# Patient Record
Sex: MALE | Race: WHITE | HISPANIC OR LATINO | Employment: FULL TIME | URBAN - METROPOLITAN AREA
[De-identification: names, ages, dates, MRNs, and addresses within clinical notes are randomized per-mention and may not be internally consistent; named-entity substitution may affect disease eponyms.]

---

## 2021-08-09 ENCOUNTER — OFFICE VISIT (OUTPATIENT)
Dept: BEHAVIORAL HEALTH | Facility: CLINIC | Age: 26
End: 2021-08-09
Payer: COMMERCIAL

## 2021-08-09 VITALS — HEIGHT: 69 IN | WEIGHT: 171.2 LBS | BODY MASS INDEX: 25.36 KG/M2

## 2021-08-09 DIAGNOSIS — F90.2 ATTENTION DEFICIT HYPERACTIVITY DISORDER (ADHD), COMBINED TYPE: ICD-10-CM

## 2021-08-09 DIAGNOSIS — F41.1 GENERALIZED ANXIETY DISORDER: ICD-10-CM

## 2021-08-09 PROCEDURE — 99204 OFFICE O/P NEW MOD 45 MIN: CPT | Mod: GC | Performed by: PSYCHIATRY & NEUROLOGY

## 2021-08-09 PROCEDURE — 96127 BRIEF EMOTIONAL/BEHAV ASSMT: CPT | Performed by: PSYCHIATRY & NEUROLOGY

## 2021-08-09 RX ORDER — DEXTROAMPHETAMINE SACCHARATE, AMPHETAMINE ASPARTATE, DEXTROAMPHETAMINE SULFATE AND AMPHETAMINE SULFATE 3.75; 3.75; 3.75; 3.75 MG/1; MG/1; MG/1; MG/1
15 TABLET ORAL 2 TIMES DAILY
Qty: 60 TABLET | Refills: 0 | Status: SHIPPED | OUTPATIENT
Start: 2021-10-01 | End: 2021-10-31

## 2021-08-09 RX ORDER — DEXTROAMPHETAMINE SACCHARATE, AMPHETAMINE ASPARTATE, DEXTROAMPHETAMINE SULFATE AND AMPHETAMINE SULFATE 3.75; 3.75; 3.75; 3.75 MG/1; MG/1; MG/1; MG/1
15 TABLET ORAL 2 TIMES DAILY
Qty: 60 TABLET | Refills: 0 | Status: SHIPPED | OUTPATIENT
Start: 2021-09-01 | End: 2021-10-01

## 2021-08-09 RX ORDER — FLUOXETINE HYDROCHLORIDE 40 MG/1
40 CAPSULE ORAL DAILY
Qty: 90 CAPSULE | Refills: 0 | Status: SHIPPED
Start: 2021-08-09 | End: 2022-03-04

## 2021-08-09 RX ORDER — SIMVASTATIN 10 MG
10 TABLET ORAL NIGHTLY
COMMUNITY

## 2021-08-09 RX ORDER — DEXTROAMPHETAMINE SACCHARATE, AMPHETAMINE ASPARTATE, DEXTROAMPHETAMINE SULFATE AND AMPHETAMINE SULFATE 3.75; 3.75; 3.75; 3.75 MG/1; MG/1; MG/1; MG/1
15 TABLET ORAL 2 TIMES DAILY
Qty: 60 TABLET | Refills: 0 | Status: SHIPPED | OUTPATIENT
Start: 2021-10-31 | End: 2022-01-05 | Stop reason: SDUPTHER

## 2021-08-09 ASSESSMENT — ANXIETY QUESTIONNAIRES
2. NOT BEING ABLE TO STOP OR CONTROL WORRYING: NOT AT ALL
1. FEELING NERVOUS, ANXIOUS, OR ON EDGE: NOT AT ALL
3. WORRYING TOO MUCH ABOUT DIFFERENT THINGS: NOT AT ALL
IF YOU CHECKED OFF ANY PROBLEMS ON THIS QUESTIONNAIRE, HOW DIFFICULT HAVE THESE PROBLEMS MADE IT FOR YOU TO DO YOUR WORK, TAKE CARE OF THINGS AT HOME, OR GET ALONG WITH OTHER PEOPLE: NOT DIFFICULT AT ALL
4. TROUBLE RELAXING: NOT AT ALL
7. FEELING AFRAID AS IF SOMETHING AWFUL MIGHT HAPPEN: NOT AT ALL
5. BEING SO RESTLESS THAT IT IS HARD TO SIT STILL: NOT AT ALL
6. BECOMING EASILY ANNOYED OR IRRITABLE: NOT AT ALL
GAD7 TOTAL SCORE: 0

## 2021-08-09 ASSESSMENT — PATIENT HEALTH QUESTIONNAIRE - PHQ9
CLINICAL INTERPRETATION OF PHQ2 SCORE: 0
5. POOR APPETITE OR OVEREATING: 0 - NOT AT ALL

## 2021-08-09 NOTE — PROGRESS NOTES
"  INITIAL PSYCHIATRIC EVALUATION      This provider informed the patient their medical records are totally confidential except for the use by other providers involved in their care, or if the patient signs a release, or to report instances of child or elder abuse, or if it is determined they are an immediate risk to harm themselves or others.      CHIEF COMPLAINT  \"I was referred here to be able to keep taking my medications\"       HISTORY OF PRESENT ILLNESS  Tahir Knight is a 25 y.o. old male comes in today to establish care and for evaluation of ADHD and anxiety. Patient was previously being seen at [facility] psychiatry.    Patient states he was first diagnosed with ADHD in devin high and took Adderall through middle school and high school. He discontinued this after graduating for some time and started experiencing higher levels of anxiety in 2018. Patient started seeing therapist with benefit for anxiety and was started on Prozac 10mg by psychiatry. This was tapered up to 40mg with benefit. Patient notes that after anxiety improved, he started noticing greater impact of not being able to focus and complete tasks. His job changed to requiring more frequent focus and inability to walk around and do other tasks simultaneously. He also states he started school at this time. He was tapered up on Adderall to 15mg PO BID. He has been on this combination of medications for approximately 6 months with benefit. He feels that he is able to focus and complete tasks and does well at work. He has noticed an improvement in his job performance and will be starting his Bachelor's degree in the fall after recently earning his Associates. He denies excessive worry, feeling on edge, difficulty relaxing. He states he has not had period with racing thoughts, difficulty breathing, shakiness, palpitations in several years. Patient notes he has tried XR formulation of Adderall before and disliked this as his job does not consistently require " increased focus in the afternoon and he felt this interfered with his ability to do other activities he enjoyed.     Adult ADHD Self-Report Scale (ASRS-v1.1) Symptom - answered based on when patient is not taking medication     1. How often do you have trouble wrapping up the final details of a project, once the challenging parts have been done?   Often   2. How often do you have difficulty getting things in order when you have to do a task that requires organization?   Sometime   3. How often do you have problems remembering appointments or obligations?   Very often   4. When you have a task that requires a lot of thought, how often do you avoid or delay getting started?   Sometime   5. How often do you fidget or squirm with your hands or feet when you have to sit down for a long time?   Very often   6. How often do you feel overly active and compelled to do things, like you were driven by a motor?   Very often   7. How often do you make careless mistakes when you have to work on a boring or difficult project?   Never   8. How often do you have difficulty keeping your attention when you are doing boring or repetitive work?   Very often   9. How often do you have difficulty concentrating on what people say to you, even when they are speaking to you directly?   Rarely   10. How often do you misplace or have difficulty finding things at home or at work?   Very often   11. How often are you distracted by activity or noise around you?   Rarely   12. How often do you leave your seat in meetings or other situations in which you are expected to remain seated?   Very often   13. How often do you feel restless or fidgety?   Sometime   14. How often do you have difficulty unwinding and relaxing when you have time to yourself?   Rarely   15. How often do you find yourself talking too much when you are in social situations?   Very often   16. When you're in a conversation, how often do you find yourself finishing the sentences  of the people you are talking to, before they can finish them themselves?   Sometime   17. How often do you have difficulty waiting your turn in situations when turn taking is required?   Never   18. How often do you interrupt others when they are busy?   Sometime       PSYCHIATRIC REVIEW OF SYSTEMS: denies depressive symptoms, denies symptoms of anxiety that interfere with functioning or are overwhelming, denies manic symptoms, denies psychotic symptoms including AH / VH and denies trauma related symptoms      MEDICAL REVIEW OF SYSTEMS:   Constitutional negative   Eyes negative   Ears/Nose/Mouth/Throat negative   Cardiovascular negative   Respiratory negative   Gastrointestinal negative   Genitourinary negative   Muscular negative   Integumentary negative   Neurological negative   Endocrine negative   Hematologic/Lymphatic negative     CURRENT MEDICATIONS:  Truvada PO daily   Simvastatin 10mg PO daily  Prozac 40mg PO daily  Adderall 15mg PO BID    ALLERGIES:  NKDA    PAST PSYCHIATRIC HISTORY  Prior psychiatric hospitalization: denies  Prior Self harm/suicide attempt: denies  Prior Diagnosis: ADHD, AUDI    PAST PSYCHIATRIC MEDICATIONS  • Adderall and Prozac      FAMILY HISTORY  Psychiatric diagnosis:  Maternal history of depression, siblings depression   History of suicide attempts:  denies  Substance abuse history:  denies    SUBSTANCE USE HISTORY:  ALCOHOL: once every few months   TOBACCO: denies  CANNABIS: denies  OPIOIDS: denies  PRESCRIPTION MEDICATIONS: denies  OTHERS: denies  History of inpatient/outpatient rehab treatment: n/a    SOCIAL HISTORY  Childhood: Patient grew up in [place], [place] and moved in 2019. He states positive relationship with his mother and childhood. He has an older sister and older brother as well as a younger brother. He is closest to his older brother.   Education: currently going to school for Bachelor's degree  Employment:  for [employer]   Relationship: denies  Kids:  "denies  Current living situation: lives with roommate, states positive relationship and living situation   History of emotional/physical/sexual abuse - denies      MEDICAL HISTORY  Hyperlipidemia diagnosed March 2020     PHYSICAL EXAMINAION:  Vital signs:   Vitals:    08/09/21 1501   Weight: 77.7 kg (171 lb 3.2 oz)   Height: 1.753 m (5' 9\")     Musculoskeletal: Normal gait.   Abnormal movements: no abnormal movements noted     MENTAL STATUS EXAMINATION      General:   - Grooming and hygiene: Casual and Neat,   - Apparent distress: no apparent distress ,   - Behavior: Calm  - Eye Contact:  Good,   - no psychomotor agitation or retardation =   - Participation: Active verbal participation  Orientation: Alert and Fully Oriented to person, place and time  Mood: Euthymic  Affect: Flexible and Full range,  Thought Process: Logical and Goal-directed  Thought Content: Denies suicidal or homicidal ideations, intent or plan Within normal limits  Perception: Denies auditory or visual hallucinations. No delusions noted Within normal limits  Attention span and concentration: Intact   Speech:Rate within normal limits and Volume within normal limits  Language: Appropriate   Insight: Good  Judgment: Good  Recent and remote memory: No gross evidence of memory deficits      DEPRESSION SCREENING:  No flowsheet data found.    Interpretation of PHQ-9 Total Score   Score Severity   1-4 No Depression   5-9 Mild Depression   10-14 Moderate Depression   15-19 Moderately Severe Depression   20-27 Severe Depression      SAFETY ASSESSMENT - SELF:    Does patient acknowledge current or past symptoms of dangerousness to self? no  History of suicide by family member: no  History of suicide by friend/significant other: no  Recent change in amount/specificity/intensity of suicidal thoughts or self-harm behavior? no  Current access to firearms, medications, or other identified means of suicide/self-harm? guns locked in home  If yes, willing to " restrict access to means of suicide/self-harm?   Protective factors present: good family support, close friend group        SAFETY ASSESSMENT - OTHERS:    Does patient acknowledge current or past symptoms of aggressive behavior or risk to others? no  Recent change in amount/specificity/intensity of thoughts or threats to harm others? no  Current access to firearms/other identified means of harm? guns locked in home   If yes, willing to restrict access to weapons/means of harm?        CURRENT RISK:       Suicidal: Low       Homicidal: Low       Self-Harm: Low       Relapse: Not applicable       Crisis Safety Plan Reviewed Not Indicated    MEDICAL RECORDS/LABS/DIAGNOSTIC TESTS REVIEWED:  reviewed      [place] [facility] records -   Reviewed, appropriate       ASSESSMENT  Tahir Knight is a 25 y.o. old male presenting to establish care for ADHD and anxiety. Patient has been taking Prozac 40mg daily and Adderall 15mg PO BID with benefit. He states resolution of symptoms and stability over the last 6 months. He currently denies increase in excessive worry, feeling on edge, or depressed mood. He denies suicidal ideation, plan or intent. He feels that he has been engaging well in work and school with current dose of medication. Reviewed safety of medication and controlled substance agreement with patient.       DIFFERENTIAL DIAGNOSES  1. Attention Deficit Hyperactivity Disorder, Combined Type  2. Generalized Anxiety Disorder       PLAN:  • Continue Prozac 40mg PO daily  • Continue Adderall 15mg PO BID   • Controlled Substance Agreement signed    • I reviewed clinical lab tests done in last 1 year. Patient will have labs done by PCP, requested results   • Medication options, alternatives (including no medications) and medication risks/benefits/side effects were discussed in detail.  • The patient was advised to call, message provider on MyChart, or come in to the clinic if symptoms worsen or if any future questions/issues regarding their  medications arise; the patient verbalized understanding and agreement.    • The patient was educated to call 911, call the suicide hotline, or go to local ER if having thoughts of suicide or homicide; verbalized understanding.        Return to clinic in 3 months or sooner if symptoms worsen.  Next Appointment:  instruction provided on how to make the next appointment.     The proposed treatment plan was discussed with the patient who was provided the opportunity to ask questions and make suggestions regarding alternative treatment. Patient verbalized understanding and expressed agreement with the plan.     Thank you for allowing me to participate in the care of this patient.    Aida Adkins D.O.  08/09/21    CC:   Kacy Brink M.D.

## 2021-08-30 ENCOUNTER — DOCUMENTATION (OUTPATIENT)
Dept: BEHAVIORAL HEALTH | Facility: CLINIC | Age: 26
End: 2021-08-30

## 2021-12-03 ENCOUNTER — OFFICE VISIT (OUTPATIENT)
Dept: BEHAVIORAL HEALTH | Facility: CLINIC | Age: 26
End: 2021-12-03
Payer: COMMERCIAL

## 2021-12-03 DIAGNOSIS — F90.2 ATTENTION DEFICIT HYPERACTIVITY DISORDER (ADHD), COMBINED TYPE: ICD-10-CM

## 2021-12-03 DIAGNOSIS — F41.1 GENERALIZED ANXIETY DISORDER: ICD-10-CM

## 2021-12-03 PROCEDURE — 99214 OFFICE O/P EST MOD 30 MIN: CPT | Mod: GC | Performed by: PSYCHIATRY & NEUROLOGY

## 2021-12-04 NOTE — PROGRESS NOTES
"[facility]  PSYCHIATRIC FOLLOW-UP NOTE    Persons in attendance: Patient      Reason for visit / chief complaint:   25 year old male wit history of ADHD and generalized anxiety disorder presenting for medication management. Patient was continued on Prozac 40mg and Adderall 15mg PO BID.    \"Things have been good\"      SUBJECTIVE / HPI:  Patient states he went to [place] last week on vacation and feels that this was positive. He feels he has had continued stability in mood and anxiety. He states this is a busy time at work and that makes this time more difficult. He has been engaging in activities he enjoys and states a positive work life balance. He denies changes in sleep, appetite or energy. He denies suicidal ideation, plan or intent.   He states he has had continued benefit with concentration and task completion. He usees second dose on days where he feels he has a mascorro work schedule in the afternoon. He states he enjoys being able to have control of dosing in this way and flexibility.         OBJECTIVE:    MSE :   Appearance: well groomed, appropriate    Behavior: calm, cooperative, pleasant, engaged. good eye contact.  No tics. No mannerisms.   Speech : normal rate, normal volume, normal tone   Language: normal vocabulary   Mood: \"good\"   Affect: euthymic   Thought Process: linear, coherent, goal-directed. No flight of ideas.  No loose associations   Thought Content: no suicidal or homicidal ideation and no auditory or visual hallucinations    Attention/Concentration: appropriate    Memory: no gross deficits   Orientation: oriented to person, place, situation   Neurological: Deferred   Fund of Knowledge: appropriate   Insight/Judgment: good / good            No Known Allergies     PAST PSYCHIATRIC HISTORY  Prior psychiatric hospitalization: denies  Prior Self harm/suicide attempt: denies  Prior Diagnosis: ADHD, AUDI     PAST PSYCHIATRIC MEDICATIONS  · Adderall and Prozac       FAMILY " HISTORY  Psychiatric diagnosis:  Maternal history of depression, siblings depression   History of suicide attempts:  denies  Substance abuse history:  denies     SUBSTANCE USE HISTORY:  ALCOHOL: once every few months   TOBACCO: denies  CANNABIS: denies  OPIOIDS: denies  PRESCRIPTION MEDICATIONS: denies  OTHERS: denies  History of inpatient/outpatient rehab treatment: n/a     SOCIAL HISTORY  Childhood: Patient grew up in [place], [place] and moved in 2019. He states positive relationship with his mother and childhood. He has an older sister and older brother as well as a younger brother. He is closest to his older brother.   Education: currently going to school for Bachelor's degree  Employment:  for [employer]   Relationship: denies  Kids: denies  Current living situation: lives with roommate, states positive relationship and living situation   History of emotional/physical/sexual abuse - denies           Review of systems:        Constitutional negative   Eyes negative   Ears/Nose/Mouth/Throat negative   Cardiovascular negative   Respiratory negative   Gastrointestinal negative   Genitourinary negative   Muscular negative   Integumentary negative   Neurological negative   Endocrine negative   Hematologic/Lymphatic negative       Medical Records/Labs/Diagnostic Tests Reviewed:   No new results present           Current Outpatient Medications:   •  fluoxetine, 40 mg, Oral, DAILY  •  simvastatin, 10 mg, Oral, Nightly  •  Emtricitabine-Tenofovir DF (TRUVADA PO), Take  by mouth.           ASSESSMENT:  26 year old male presenting for medication management. Patient states he has done well with medication regimen. Discussed with patient long term plans with medication and trial of Prozac reduction in the future due to prolonged period of stability and benefit. Patient states hesitation with change at this time due to increased demands that will be present at work throughout the winter.       DDX:  1.  Generalized anxiety disorder     2. Attention deficit hyperactivity disorder (ADHD), combined type             PLAN:  - Continue Adderall 15mg PO BID   -Continue Prozac 40mg PO daily   -Medication options, alternatives (including no medications) and medication risks/benefits/side effects were discussed in detail.  -The patient was advised to call, message provider on [employer], or come in to the clinic if symptoms worsen or if any future questions/issues regarding their medications arise; the patient verbalized understanding and agreement.    -The patient was educated to call 911, call the suicide hotline, or go to local ER if having thoughts of suicide or homicide; verbalized understanding.            - Medical Records/Labs/Diagnostic Tests Ordered: Nav Adkins DO

## 2022-01-05 DIAGNOSIS — F90.2 ATTENTION DEFICIT HYPERACTIVITY DISORDER (ADHD), COMBINED TYPE: ICD-10-CM

## 2022-01-05 RX ORDER — DEXTROAMPHETAMINE SACCHARATE, AMPHETAMINE ASPARTATE, DEXTROAMPHETAMINE SULFATE AND AMPHETAMINE SULFATE 3.75; 3.75; 3.75; 3.75 MG/1; MG/1; MG/1; MG/1
15 TABLET ORAL 2 TIMES DAILY
Qty: 60 TABLET | Refills: 0 | Status: SHIPPED | OUTPATIENT
Start: 2022-01-05 | End: 2022-02-07

## 2022-01-05 NOTE — TELEPHONE ENCOUNTER
Pts pharmacy never received prescription that was supposed to be sent on the day of pts appt. 01/03        Received request via: Patient    Was the patient seen in the last year in this department? Yes    Does the patient have an active prescription (recently filled or refills available) for medication(s) requested? No

## 2022-02-04 DIAGNOSIS — F90.2 ATTENTION DEFICIT HYPERACTIVITY DISORDER (ADHD), COMBINED TYPE: ICD-10-CM

## 2022-02-07 RX ORDER — DEXTROAMPHETAMINE SACCHARATE, AMPHETAMINE ASPARTATE, DEXTROAMPHETAMINE SULFATE AND AMPHETAMINE SULFATE 3.75; 3.75; 3.75; 3.75 MG/1; MG/1; MG/1; MG/1
TABLET ORAL
Qty: 60 TABLET | Refills: 0 | Status: SHIPPED | OUTPATIENT
Start: 2022-02-07 | End: 2022-03-04 | Stop reason: SDUPTHER

## 2022-03-04 ENCOUNTER — OFFICE VISIT (OUTPATIENT)
Dept: BEHAVIORAL HEALTH | Facility: CLINIC | Age: 27
End: 2022-03-04
Payer: COMMERCIAL

## 2022-03-04 DIAGNOSIS — F41.1 GENERALIZED ANXIETY DISORDER: ICD-10-CM

## 2022-03-04 DIAGNOSIS — F90.2 ATTENTION DEFICIT HYPERACTIVITY DISORDER (ADHD), COMBINED TYPE: ICD-10-CM

## 2022-03-04 PROCEDURE — 99214 OFFICE O/P EST MOD 30 MIN: CPT | Mod: GC | Performed by: STUDENT IN AN ORGANIZED HEALTH CARE EDUCATION/TRAINING PROGRAM

## 2022-03-04 RX ORDER — DEXTROAMPHETAMINE SACCHARATE, AMPHETAMINE ASPARTATE, DEXTROAMPHETAMINE SULFATE AND AMPHETAMINE SULFATE 3.75; 3.75; 3.75; 3.75 MG/1; MG/1; MG/1; MG/1
1 TABLET ORAL 2 TIMES DAILY
Qty: 60 TABLET | Refills: 0 | Status: SHIPPED | OUTPATIENT
Start: 2022-03-09 | End: 2022-04-08

## 2022-03-04 RX ORDER — FLUOXETINE HYDROCHLORIDE 20 MG/1
20 CAPSULE ORAL EVERY MORNING
Qty: 30 CAPSULE | Refills: 2 | Status: SHIPPED | OUTPATIENT
Start: 2022-03-04 | End: 2022-06-08 | Stop reason: SDUPTHER

## 2022-03-04 RX ORDER — DEXTROAMPHETAMINE SACCHARATE, AMPHETAMINE ASPARTATE, DEXTROAMPHETAMINE SULFATE AND AMPHETAMINE SULFATE 3.75; 3.75; 3.75; 3.75 MG/1; MG/1; MG/1; MG/1
15 TABLET ORAL 2 TIMES DAILY
Qty: 60 TABLET | Refills: 0 | Status: SHIPPED | OUTPATIENT
Start: 2022-04-08 | End: 2022-05-08

## 2022-03-04 RX ORDER — DEXTROAMPHETAMINE SACCHARATE, AMPHETAMINE ASPARTATE, DEXTROAMPHETAMINE SULFATE AND AMPHETAMINE SULFATE 3.75; 3.75; 3.75; 3.75 MG/1; MG/1; MG/1; MG/1
15 TABLET ORAL 2 TIMES DAILY
Qty: 60 TABLET | Refills: 0 | Status: SHIPPED | OUTPATIENT
Start: 2022-05-08 | End: 2022-06-07

## 2022-03-05 NOTE — PROGRESS NOTES
"[facility]  PSYCHIATRIC FOLLOW-UP NOTE    Persons in attendance: Patient      Reason for visit / chief complaint:   25 year old male wit history of ADHD and generalized anxiety disorder presenting for medication management. Patient was continued on Prozac 40mg and Adderall 15mg PO BID.    \"Things have still been good\"      SUBJECTIVE / HPI:  With regards to ADHD, patient states he has been able to continue doing well at work. He has been able to concentrate and complete tasks well. He denies palpitations and notes that he has to remember to eat during the day, but is able to do so.   With regards to mood and anxiety, patient state she has had good mood and has not had anxiety. He feels he has overall had mood stability. He has been spending time with friends and has trips planned with others.       OBJECTIVE:    MSE :   Appearance: well groomed, appropriate    Behavior: calm, cooperative, pleasant, engaged. good eye contact.  No tics. No mannerisms.   Speech : normal rate, normal volume, normal tone   Language: normal vocabulary   Mood: \"good\"   Affect: euthymic   Thought Process: linear, coherent, goal-directed. No flight of ideas.  No loose associations   Thought Content: no suicidal or homicidal ideation and no auditory or visual hallucinations    Attention/Concentration: appropriate    Memory: no gross deficits   Orientation: oriented to person, place, situation   Neurological: Deferred   Fund of Knowledge: appropriate   Insight/Judgment: good / good            No Known Allergies     PAST PSYCHIATRIC HISTORY  Prior psychiatric hospitalization: denies  Prior Self harm/suicide attempt: denies  Prior Diagnosis: ADHD, AUDI     PAST PSYCHIATRIC MEDICATIONS  · Adderall and Prozac       FAMILY HISTORY  Psychiatric diagnosis:  Maternal history of depression, siblings depression   History of suicide attempts:  denies  Substance abuse history:  denies     SUBSTANCE USE HISTORY:  ALCOHOL: once every few months "   TOBACCO: denies  CANNABIS: denies  OPIOIDS: denies  PRESCRIPTION MEDICATIONS: denies  OTHERS: denies  History of inpatient/outpatient rehab treatment: n/a     SOCIAL HISTORY  Childhood: Patient grew up in [place], [place] and moved in 2019. He states positive relationship with his mother and childhood. He has an older sister and older brother as well as a younger brother. He is closest to his older brother.   Education: currently going to school for Bachelor's degree  Employment:  for [employer]   Relationship: denies  Kids: denies  Current living situation: lives with roommate, states positive relationship and living situation   History of emotional/physical/sexual abuse - denies           Review of systems:        Constitutional negative   Eyes negative   Ears/Nose/Mouth/Throat negative   Cardiovascular negative   Respiratory negative   Gastrointestinal negative   Genitourinary negative   Muscular negative   Integumentary negative   Neurological negative   Endocrine negative   Hematologic/Lymphatic negative       Medical Records/Labs/Diagnostic Tests Reviewed:   No new results present           Current Outpatient Medications:   •  [START ON 3/9/2022] amphetamine-dextroamphetamine, 1 Tablet, Oral, BID  •  [START ON 4/8/2022] amphetamine-dextroamphetamine, 15 mg, Oral, BID  •  [START ON 5/8/2022] amphetamine-dextroamphetamine, 15 mg, Oral, BID  •  FLUoxetine, 20 mg, Oral, QAM  •  simvastatin, 10 mg, Oral, Nightly  •  Emtricitabine-Tenofovir DF (TRUVADA PO), Take  by mouth.           ASSESSMENT:  26 year old male presenting for medication management. Patient states he has done well with medication regimen. Patient states desire to trial lower dose of Prozac due to stability with current dose for approximately 4 years and improvement in stability of stressors. Advised patient to message if anxiety increased to be able to increase dose to 40mg if required.     DDX:  1. Attention deficit hyperactivity  disorder (ADHD), combined type  amphetamine-dextroamphetamine (ADDERALL) 15 MG tablet    amphetamine-dextroamphetamine (ADDERALL, 15MG,) 15 MG tablet    amphetamine-dextroamphetamine (ADDERALL, 15MG,) 15 MG tablet   2. Generalized anxiety disorder  FLUoxetine (PROZAC) 20 MG Cap           PLAN:  -Decrease Prozac 20mg PO daily   - Continue Adderall 15mg PO BID   -Medication options, alternatives (including no medications) and medication risks/benefits/side effects were discussed in detail.  -The patient was advised to call, message provider on [email or web address], or come in to the clinic if symptoms worsen or if any future questions/issues regarding their medications arise; the patient verbalized understanding and agreement.    -The patient was educated to call 911, call the suicide hotline, or go to local ER if having thoughts of suicide or homicide; verbalized understanding.            - Medical Records/Labs/Diagnostic Tests Ordered: None      Aida Adkins DO

## 2022-06-08 ENCOUNTER — TELEMEDICINE (OUTPATIENT)
Dept: BEHAVIORAL HEALTH | Facility: CLINIC | Age: 27
End: 2022-06-08
Payer: COMMERCIAL

## 2022-06-08 DIAGNOSIS — F90.2 ATTENTION DEFICIT HYPERACTIVITY DISORDER (ADHD), COMBINED TYPE: ICD-10-CM

## 2022-06-08 DIAGNOSIS — F41.1 GENERALIZED ANXIETY DISORDER: ICD-10-CM

## 2022-06-08 PROCEDURE — 99214 OFFICE O/P EST MOD 30 MIN: CPT | Mod: 95,GC | Performed by: STUDENT IN AN ORGANIZED HEALTH CARE EDUCATION/TRAINING PROGRAM

## 2022-06-08 RX ORDER — DEXTROAMPHETAMINE SACCHARATE, AMPHETAMINE ASPARTATE, DEXTROAMPHETAMINE SULFATE AND AMPHETAMINE SULFATE 3.75; 3.75; 3.75; 3.75 MG/1; MG/1; MG/1; MG/1
15 TABLET ORAL 2 TIMES DAILY
Qty: 60 TABLET | Refills: 0 | Status: SHIPPED | OUTPATIENT
Start: 2022-08-11 | End: 2022-09-10

## 2022-06-08 RX ORDER — DEXTROAMPHETAMINE SACCHARATE, AMPHETAMINE ASPARTATE, DEXTROAMPHETAMINE SULFATE AND AMPHETAMINE SULFATE 3.75; 3.75; 3.75; 3.75 MG/1; MG/1; MG/1; MG/1
15 TABLET ORAL 2 TIMES DAILY
Qty: 60 TABLET | Refills: 0 | Status: SHIPPED | OUTPATIENT
Start: 2022-06-12 | End: 2022-07-12

## 2022-06-08 RX ORDER — FLUOXETINE HYDROCHLORIDE 20 MG/1
20 CAPSULE ORAL EVERY MORNING
Qty: 90 CAPSULE | Refills: 0 | Status: SHIPPED | OUTPATIENT
Start: 2022-06-08

## 2022-06-08 RX ORDER — DEXTROAMPHETAMINE SACCHARATE, AMPHETAMINE ASPARTATE, DEXTROAMPHETAMINE SULFATE AND AMPHETAMINE SULFATE 3.75; 3.75; 3.75; 3.75 MG/1; MG/1; MG/1; MG/1
15 TABLET ORAL 2 TIMES DAILY
Qty: 60 TABLET | Refills: 0 | Status: SHIPPED | OUTPATIENT
Start: 2022-07-12 | End: 2022-08-11

## 2022-06-08 NOTE — PROGRESS NOTES
"[facility]  PSYCHIATRIC FOLLOW-UP NOTE    Persons in attendance: Patient      Reason for visit / chief complaint:   25 year old male wit history of ADHD and generalized anxiety disorder presenting for medication management. Prozac was decreased to 20mg PO daily. Adderall 15mg PO BID was continued.    \"I'm good\"      SUBJECTIVE / HPI:  With regards to ADHD, patient states he has continued to do well. He has had improvement in planning and focus. He denies palpitations or changes in sleep.  With regards to anxiety, patient states continued low anxiety. He did not notice a difference with decrease in Prozac. He has had increased recent stressors as he found out a few weeks ago that he will be moving to [employer] next week for work and received a promotion. Patient states he has not had increase in anxiety in relation to this.      OBJECTIVE:    MSE :   Appearance: well groomed, appropriate    Behavior: calm, cooperative, pleasant, engaged. good eye contact.  No tics. No mannerisms.   Speech : normal rate, normal volume, normal tone   Language: normal vocabulary   Mood: \"pretty good\"   Affect: euthymic   Thought Process: linear, coherent, goal-directed. No flight of ideas.  No loose associations   Thought Content: no suicidal or homicidal ideation and no auditory or visual hallucinations    Attention/Concentration: appropriate    Memory: no gross deficits   Orientation: oriented to person, place, situation   Neurological: Deferred   Fund of Knowledge: appropriate   Insight/Judgment: good / good            No Known Allergies     PAST PSYCHIATRIC HISTORY  Prior psychiatric hospitalization: denies  Prior Self harm/suicide attempt: denies  Prior Diagnosis: ADHD, AUDI     PAST PSYCHIATRIC MEDICATIONS  · Adderall and Prozac       FAMILY HISTORY  Psychiatric diagnosis:  Maternal history of depression, siblings depression   History of suicide attempts:  denies  Substance abuse history:  denies     SUBSTANCE USE " HISTORY:  ALCOHOL: once every few months   TOBACCO: denies  CANNABIS: denies  OPIOIDS: denies  PRESCRIPTION MEDICATIONS: denies  OTHERS: denies  History of inpatient/outpatient rehab treatment: n/a     SOCIAL HISTORY  Childhood: Patient grew up in [place], [place] and moved in 2019. He states positive relationship with his mother and childhood. He has an older sister and older brother as well as a younger brother. He is closest to his older brother.   Education: currently going to school for Bachelor's degree  Employment:  for [employer]   Relationship: denies  Kids: denies  Current living situation: lives with roommate, states positive relationship and living situation   History of emotional/physical/sexual abuse - denies           Review of systems:        Constitutional negative   Eyes negative   Ears/Nose/Mouth/Throat negative   Cardiovascular negative   Respiratory negative   Gastrointestinal negative   Genitourinary negative   Muscular negative   Integumentary negative   Neurological negative   Endocrine negative   Hematologic/Lymphatic negative       Medical Records/Labs/Diagnostic Tests Reviewed:   No new results present           Current Outpatient Medications:   •  FLUoxetine, 20 mg, Oral, QAM  •  [START ON 6/12/2022] amphetamine-dextroamphetamine, 15 mg, Oral, BID  •  [START ON 8/11/2022] amphetamine-dextroamphetamine, 15 mg, Oral, BID  •  [START ON 7/12/2022] amphetamine-dextroamphetamine, 15 mg, Oral, BID  •  simvastatin, 10 mg, Oral, Nightly  •  Emtricitabine-Tenofovir DF (TRUVADA PO), Take  by mouth.           ASSESSMENT:  26 year old male presenting for medication management. Patient has tolerated decrease in Prozac well. He continues to have benefit with Adderall for focus and organization. Patient will re-establish in [place] for follow up.     DDX:  1. Generalized anxiety disorder  FLUoxetine (PROZAC) 20 MG Cap   2. Attention deficit hyperactivity disorder (ADHD), combined type   amphetamine-dextroamphetamine (ADDERALL, 15MG,) 15 MG tablet    amphetamine-dextroamphetamine (ADDERALL, 15MG,) 15 MG tablet    amphetamine-dextroamphetamine (ADDERALL, 15MG,) 15 MG tablet           PLAN:  -Continue Prozac 20mg PO daily   - Continue Adderall 15mg PO BID   -Medication options, alternatives (including no medications) and medication risks/benefits/side effects were discussed in detail.  -The patient was advised to call, message provider on [employer], or come in to the clinic if symptoms worsen or if any future questions/issues regarding their medications arise; the patient verbalized understanding and agreement.    -The patient was educated to call 911, call the suicide hotline, or go to local ER if having thoughts of suicide or homicide; verbalized understanding.            - Medical Records/Labs/Diagnostic Tests Ordered: Nav Adkins DO